# Patient Record
Sex: MALE | Race: BLACK OR AFRICAN AMERICAN | Employment: FULL TIME | URBAN - METROPOLITAN AREA
[De-identification: names, ages, dates, MRNs, and addresses within clinical notes are randomized per-mention and may not be internally consistent; named-entity substitution may affect disease eponyms.]

---

## 2020-10-05 ENCOUNTER — HOSPITAL ENCOUNTER (EMERGENCY)
Age: 25
Discharge: HOME | End: 2020-10-05
Attending: STUDENT IN AN ORGANIZED HEALTH CARE EDUCATION/TRAINING PROGRAM
Payer: MEDICAID

## 2020-10-05 ENCOUNTER — APPOINTMENT (OUTPATIENT)
Dept: GENERAL RADIOLOGY | Age: 25
End: 2020-10-05
Attending: STUDENT IN AN ORGANIZED HEALTH CARE EDUCATION/TRAINING PROGRAM
Payer: MEDICAID

## 2020-10-05 VITALS
TEMPERATURE: 98.5 F | DIASTOLIC BLOOD PRESSURE: 75 MMHG | HEART RATE: 58 BPM | HEIGHT: 66 IN | OXYGEN SATURATION: 100 % | RESPIRATION RATE: 16 BRPM | SYSTOLIC BLOOD PRESSURE: 121 MMHG | BODY MASS INDEX: 20.89 KG/M2 | WEIGHT: 130 LBS

## 2020-10-05 DIAGNOSIS — S62.339B: Primary | ICD-10-CM

## 2020-10-05 DIAGNOSIS — S61.411A LACERATION OF RIGHT HAND WITHOUT FOREIGN BODY, INITIAL ENCOUNTER: ICD-10-CM

## 2020-10-05 PROCEDURE — 99284 EMERGENCY DEPT VISIT MOD MDM: CPT

## 2020-10-05 PROCEDURE — 90471 IMMUNIZATION ADMIN: CPT

## 2020-10-05 PROCEDURE — 74011250636 HC RX REV CODE- 250/636: Performed by: STUDENT IN AN ORGANIZED HEALTH CARE EDUCATION/TRAINING PROGRAM

## 2020-10-05 PROCEDURE — 90715 TDAP VACCINE 7 YRS/> IM: CPT | Performed by: STUDENT IN AN ORGANIZED HEALTH CARE EDUCATION/TRAINING PROGRAM

## 2020-10-05 PROCEDURE — 75810000053 HC SPLINT APPLICATION

## 2020-10-05 PROCEDURE — 74011250637 HC RX REV CODE- 250/637: Performed by: STUDENT IN AN ORGANIZED HEALTH CARE EDUCATION/TRAINING PROGRAM

## 2020-10-05 PROCEDURE — 73130 X-RAY EXAM OF HAND: CPT

## 2020-10-05 PROCEDURE — 75810000293 HC SIMP/SUPERF WND  RPR

## 2020-10-05 RX ORDER — CEPHALEXIN 500 MG/1
500 CAPSULE ORAL 4 TIMES DAILY
Qty: 28 CAP | Refills: 0 | Status: SHIPPED | OUTPATIENT
Start: 2020-10-05 | End: 2020-10-12

## 2020-10-05 RX ORDER — OXYCODONE AND ACETAMINOPHEN 5; 325 MG/1; MG/1
1 TABLET ORAL
Qty: 12 TAB | Refills: 0 | Status: SHIPPED | OUTPATIENT
Start: 2020-10-05 | End: 2020-10-07 | Stop reason: SDUPTHER

## 2020-10-05 RX ORDER — LIDOCAINE HYDROCHLORIDE 10 MG/ML
INJECTION, SOLUTION EPIDURAL; INFILTRATION; INTRACAUDAL; PERINEURAL
Status: DISCONTINUED
Start: 2020-10-05 | End: 2020-10-05 | Stop reason: HOSPADM

## 2020-10-05 RX ORDER — OXYCODONE AND ACETAMINOPHEN 5; 325 MG/1; MG/1
1 TABLET ORAL
Status: COMPLETED | OUTPATIENT
Start: 2020-10-05 | End: 2020-10-05

## 2020-10-05 RX ORDER — CEPHALEXIN 250 MG/1
500 CAPSULE ORAL
Status: COMPLETED | OUTPATIENT
Start: 2020-10-05 | End: 2020-10-05

## 2020-10-05 RX ADMIN — CEPHALEXIN 500 MG: 250 CAPSULE ORAL at 11:58

## 2020-10-05 RX ADMIN — OXYCODONE HYDROCHLORIDE AND ACETAMINOPHEN 1 TABLET: 5; 325 TABLET ORAL at 11:58

## 2020-10-05 RX ADMIN — TETANUS TOXOID, REDUCED DIPHTHERIA TOXOID AND ACELLULAR PERTUSSIS VACCINE, ADSORBED 0.5 ML: 5; 2.5; 8; 8; 2.5 SUSPENSION INTRAMUSCULAR at 11:58

## 2020-10-05 NOTE — ED TRIAGE NOTES
Patient hit mirror, patient with swelling and pain to right hand with laceration, bleeding controlled

## 2020-10-05 NOTE — ED PROVIDER NOTES
54-year-old male with no significant past medical history presents to the ER with complaint of right hand pain. He punched a mirror in frustration approximately 1 hour prior to arrival.  He noted bleeding from a laceration that he sustained on the dorsum of his right hand. He said that he washed the wound out with soap and water and was able to control the bleeding with manual pressure. He did not take any pain medication prior to coming here. No other complaints. History reviewed. No pertinent past medical history. History reviewed. No pertinent surgical history. History reviewed. No pertinent family history. Social History     Socioeconomic History    Marital status: SINGLE     Spouse name: Not on file    Number of children: Not on file    Years of education: Not on file    Highest education level: Not on file   Occupational History    Not on file   Social Needs    Financial resource strain: Not on file    Food insecurity     Worry: Not on file     Inability: Not on file    Transportation needs     Medical: Not on file     Non-medical: Not on file   Tobacco Use    Smoking status: Light Tobacco Smoker    Smokeless tobacco: Never Used   Substance and Sexual Activity    Alcohol use:  Yes    Drug use: Not Currently    Sexual activity: Not on file   Lifestyle    Physical activity     Days per week: Not on file     Minutes per session: Not on file    Stress: Not on file   Relationships    Social connections     Talks on phone: Not on file     Gets together: Not on file     Attends Buddhism service: Not on file     Active member of club or organization: Not on file     Attends meetings of clubs or organizations: Not on file     Relationship status: Not on file    Intimate partner violence     Fear of current or ex partner: Not on file     Emotionally abused: Not on file     Physically abused: Not on file     Forced sexual activity: Not on file   Other Topics Concern    Not on file   Social History Narrative    Not on file         ALLERGIES: Patient has no known allergies. Review of Systems   Constitutional: Negative for activity change and appetite change. HENT: Negative for drooling and facial swelling. Eyes: Negative for pain and discharge. Respiratory: Negative for apnea and choking. Cardiovascular: Negative for palpitations and leg swelling. Gastrointestinal: Negative for blood in stool and rectal pain. Endocrine: Negative for polydipsia and polyphagia. Genitourinary: Negative for genital sores and hematuria. Musculoskeletal: Negative for gait problem and neck stiffness. Right hand pain, laceration, and bleeding   Skin: Negative for color change and rash. Allergic/Immunologic: Negative for environmental allergies. Neurological: Negative for tremors. Hematological: Negative for adenopathy. Psychiatric/Behavioral: Negative for agitation and behavioral problems. Vitals:    10/05/20 1011   BP: 121/75   Pulse: (!) 58   Resp: 16   Temp: 98.5 °F (36.9 °C)   SpO2: 100%   Weight: 59 kg (130 lb)   Height: 5' 5.5\" (1.664 m)            Physical Exam  Vitals signs and nursing note reviewed. Constitutional:       Appearance: Normal appearance. HENT:      Head: Normocephalic and atraumatic. Eyes:      Extraocular Movements: Extraocular movements intact. Pupils: Pupils are equal, round, and reactive to light. Cardiovascular:      Rate and Rhythm: Normal rate and regular rhythm. Heart sounds: Normal heart sounds. No murmur. No friction rub. Pulmonary:      Effort: Pulmonary effort is normal.      Breath sounds: Normal breath sounds. Abdominal:      Palpations: Abdomen is soft. Musculoskeletal: Normal range of motion. Comments: ~3cm in diameter laceration with irregular borders on dorsum of right hand starting between the second and third MCP joints and extending proximally. Hematoma over lateral dorsum of right hand. Significantly decreased range of motion of all digits secondary to pain. Neurovascularly intact, 2+ radial pulse. Skin:     General: Skin is warm and dry. Capillary Refill: Capillary refill takes less than 2 seconds. Neurological:      General: No focal deficit present. Mental Status: He is alert. Psychiatric:         Mood and Affect: Mood normal.          MDM  Number of Diagnoses or Management Options  Boxer's fracture, open, initial encounter:   Laceration of right hand without foreign body, initial encounter:   Diagnosis management comments: 76-year-old male with right hand laceration. X-ray ordered from triage is significant for a boxer's fracture of the right fifth metacarpal.  Case discussed with orthopedic surgeon Dr. Xander Carlson who recommended washout of the laceration at bedside and closure in the ED. Patient will receive a tetanus shot, ulnar gutter splint, prescription for Keflex and Percocet and he should follow-up with the orthopedic surgery clinic in 1 to 2 days. See procedure note for details of laceration repair. Ulnar gutter splint applied by RN [place]. Pt has been reexamined. Patient has no new complaints, changes, or physical findings. Care plan outlined and precautions discussed. Results were reviewed with the patient. All medications were reviewed with the patient; will d/c home with PMD f/u. All of pt's questions and concerns were addressed. Patient was instructed and agrees to follow up with PMD, as well as to return to the ED upon further deterioration. Patient is ready to go home. This note was dictated utilizing voice recognition software which may lead to typographical errors.  I apologize in advance if the situation occurs.  If questions arise please do not hesitate to contact me or call our department.     Subha Diaz DO           Wound Repair    Date/Time: 10/5/2020 12:01 PM  Performed by: attendingLocation details: right hand  Wound length:2.6 - 7.5 cm  Anesthesia: local infiltration    Anesthesia:  Local Anesthetic: lidocaine 1% without epinephrine  Anesthetic total: 2 mL  Foreign bodies: no foreign bodies  Irrigation solution: saline  Irrigation method: syringe  Debridement: minimal  Wound skin closure material used: 3-0 nylon. Number of sutures: 3  Suture technique: 2 simple interrupted and 1 horizontal mattress. Approximation: loose  Dressing: 4x4  Patient tolerance: Patient tolerated the procedure well with no immediate complications  My total time at bedside, performing this procedure was 1-15 minutes.

## 2020-10-05 NOTE — LETTER
[facility] EMERGENCY DEPT 
[address] 
[address] [facility] [id number] [phone] Work/School Note Date: 10/5/2020 To Whom It May concern: 
 
Bob Galeano was seen and treated today in the emergency room by the following provider(s): 
Attending Provider: Thompson Sequeira DO. Bob Galeano is excused from work/school on 10/5/2020 through 10/8/2020. He is medically clear to return to work/school on 10/9/2020.   
  
 
Sincerely, 
 
 
 
 
Estefania Crouch DO

## 2020-10-05 NOTE — DISCHARGE INSTRUCTIONS
Patient Education        Broken Hand: Care Instructions  Your Care Instructions  A hand can break (fracture) during sports, a fall, or other accidents. The break may happen when your hand twists, is hit, or is used to protect you in a fall. Fractures can range from a small, hairline crack, to a bone or bones broken into two or more pieces. Your treatment depends on how bad the break is. Your doctor may have put your hand in a brace, splint, or cast to allow it to heal or to keep it stable until you see another doctor. It may take weeks or months for your hand to heal. You can help it heal with some care at home. You heal best when you take good care of yourself. Eat a variety of healthy foods, and don't smoke. Follow-up care is a key part of your treatment and safety. Be sure to make and go to all appointments, and call your doctor if you are having problems. It's also a good idea to know your test results and keep a list of the medicines you take. How can you care for yourself at home? · Put ice or a cold pack on your hand for 10 to 20 minutes at a time. Try to do this every 1 to 2 hours for the next 3 days (when you are awake). Put a thin cloth between the ice and your cast or splint. Keep your cast or splint dry. · Follow the cast care instructions your doctor gives you. If you have a splint, do not take it off unless your doctor tells you to. · Be safe with medicines. Take pain medicines exactly as directed. ? If the doctor gave you a prescription medicine for pain, take it as prescribed. ? If you are not taking a prescription pain medicine, ask your doctor if you can take an over-the-counter medicine. · Prop up your hand on pillows when you sit or lie down in the first few days after the injury. Keep your hand higher than the level of your heart. This will help reduce swelling. · Follow instructions for exercises to keep your arm strong.   · Wiggle your uninjured fingers often to reduce swelling and stiffness, but do not use that hand to grasp or carry anything. When should you call for help? Call your doctor now or seek immediate medical care if:    · You have new or worse pain.     · Your hand or fingers are cool or pale or change color.     · Your cast or splint feels too tight.     · You have tingling, weakness, or numbness in your hand or fingers. Watch closely for changes in your health, and be sure to contact your doctor if:    · You do not get better as expected.     · You have problems with your cast or splint. Where can you learn more? Go to http://www.arz[employer].com/  Enter Q241 in the search box to learn more about \"Broken Hand: Care Instructions. \"  Current as of: March 2, 2020               Content Version: 12.6  © 4292-0170 [employer]. Care instructions adapted under license by [employer] (which disclaims liability or warranty for this information). If you have questions about a medical condition or this instruction, always ask your healthcare professional. [address] any warranty or liability for your use of this information.

## 2020-10-06 NOTE — ED NOTES
10/6/2020: 1150- I called Mr. Adarsh Guzman back and spoke with him, I explained that Dr. Toy Tesfaye can not help me with this problem, nor can [facility]. I told Mr. Adarsh Guzman that he could come back and be seen and we would try to assist with the bill or he can follow up with Dr. Gaona [place] tomorrow or to see ortho, he was very greatful that I tried all these avenues

## 2020-10-06 NOTE — ED NOTES
10/6/2020: 1130 - I called [facility] and spoke with the pharmacy and they are not allowed to fill paper Rx from us because we do not have a clause with the board of pharmacy

## 2020-10-06 NOTE — ED NOTES
10/6/2020: 0715- received a call from Mr. Radha Arambula about his [employer], it was not e-scribed to [facility] pharmacy that the patient received a paper Rx and [facility] will not fill it due to state law, I told Martir Eliza that I would call them when they open and attempt to resolve this problem.

## 2020-10-06 NOTE — ED NOTES
10/6/2020: 1115 - Mr. Nelly Lyons called back, I spoke with Dr. Ysabel Simmons about issue and she said that he will have to wait until tomorrow.

## 2020-10-07 RX ORDER — OXYCODONE AND ACETAMINOPHEN 5; 325 MG/1; MG/1
1 TABLET ORAL
Qty: 12 TAB | Refills: 0 | Status: SHIPPED | OUTPATIENT
Start: 2020-10-07 | End: 2020-10-10

## 2020-10-07 NOTE — ED NOTES
Patient called emergency department as he states his prescriptions were not filled and that he was unable to receive his narcotic pain prescription he has a boxer's fracture noted on his x-ray which was diagnosed 2 days ago. Multiple calls to the patient and the [facility] DR WILBERT CASTRO.   Patient states small more Discrete prescription and ultimately it was found out that they did actually keep his prescription which they were just written and provided with a electronic prescription for his pain medications    I was not involved with the care of this patient I just verified his prescriptions

## 2021-04-07 PROBLEM — R10.11 ABDOMINAL PAIN, RIGHT UPPER QUADRANT: Status: ACTIVE | Noted: 2017-08-03

## 2021-04-07 PROBLEM — K92.1 BLOOD IN STOOL: Status: ACTIVE | Noted: 2017-08-16

## 2021-04-07 PROBLEM — R63.4 UNEXPLAINED WEIGHT LOSS: Status: ACTIVE | Noted: 2017-08-03
